# Patient Record
Sex: MALE | HISPANIC OR LATINO | URBAN - METROPOLITAN AREA
[De-identification: names, ages, dates, MRNs, and addresses within clinical notes are randomized per-mention and may not be internally consistent; named-entity substitution may affect disease eponyms.]

---

## 2017-09-07 ENCOUNTER — APPOINTMENT (OUTPATIENT)
Dept: RADIOLOGY | Facility: IMAGING CENTER | Age: 47
End: 2017-09-07
Attending: PREVENTIVE MEDICINE
Payer: COMMERCIAL

## 2017-09-07 ENCOUNTER — OCCUPATIONAL MEDICINE (OUTPATIENT)
Dept: OCCUPATIONAL MEDICINE | Facility: CLINIC | Age: 47
End: 2017-09-07
Payer: COMMERCIAL

## 2017-09-07 VITALS
BODY MASS INDEX: 28.79 KG/M2 | WEIGHT: 190 LBS | OXYGEN SATURATION: 94 % | HEART RATE: 100 BPM | SYSTOLIC BLOOD PRESSURE: 142 MMHG | DIASTOLIC BLOOD PRESSURE: 108 MMHG | HEIGHT: 68 IN | RESPIRATION RATE: 14 BRPM

## 2017-09-07 DIAGNOSIS — M65.311 TRIGGER THUMB OF RIGHT HAND: ICD-10-CM

## 2017-09-07 PROCEDURE — 73140 X-RAY EXAM OF FINGER(S): CPT | Mod: TC,RT | Performed by: EMERGENCY MEDICINE

## 2017-09-07 PROCEDURE — 99204 OFFICE O/P NEW MOD 45 MIN: CPT | Performed by: PREVENTIVE MEDICINE

## 2017-09-07 RX ORDER — PREDNISONE 20 MG/1
20 TABLET ORAL 2 TIMES DAILY
Qty: 14 TAB | Refills: 0 | Status: SHIPPED | OUTPATIENT
Start: 2017-09-07 | End: 2021-01-13

## 2017-09-07 ASSESSMENT — PAIN SCALES - GENERAL: PAINLEVEL: 5=MODERATE PAIN

## 2017-09-07 NOTE — LETTER
"EMPLOYEE’S CLAIM FOR COMPENSATION/ REPORT OF INITIAL TREATMENT  FORM C-4    EMPLOYEE’S CLAIM - PROVIDE ALL INFORMATION REQUESTED   First Name  Yamil Last Name  Chuy Kidd Birthdate                    1970                Sex  male Claim Number   Home Address  [address] Age  47 y.o. Height  1.727 m (5' 8\") Weight  86.2 kg (190 lb) [facility]     [facility] [place] [facility] Zip  [address] Telephone  [phone] (home)    Mailing Address  [address] [facility] [facility] Zip  [address] Primary Language Spoken  English    Insurer   Third Party   [employer]   Employee's Occupation (Job Title) When Injury or Occupational Disease Occurred       Employer's Name  [employer]  Telephone  [phone]    Employer Address  [address]  Zip  [address]   Date of Injury  7/5/2017               Hour of Injury  9:00 PM Date Employer Notified  7/5/2017 Last Day of Work after Injury or Occupational Disease  9/7/2017 Supervisor to Whom Injury Reported  Chema Doyle    Address or Location of Accident (if applicable)  [[facility],[place] ]   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 (Be specific an answer in detail. Use additional sheet if necessary)  part fell off of conveyor and struck employees (me) right thumb    If you believe that you have an occupational disease, when did you first have knowledge of the disability and it relationship to your employment?  N/A  Witnesses to the Accident  N/A       Nature of Injury or Occupational Disease  Defer  Part(s) of Body Injured or Affected  Hand (R), Thumb (R), Defer    I certify that the above is true and correct to the best of my knowledge and that I have provided this information in order to obtain the benefits of [facility] Industrial Insurance and " Occupational Diseases [place]  I hereby authorize any physician, chiropractor, surgeon, practitioner, or other person, any hospital, including [facility] or [facility] hospital, any medical service organization, any insurance company, or other institution or organization to release to each other, any medical or other information, including benefits paid or payable, pertinent to this injury or disease, except information relative to diagnosis, treatment and/or counseling for AIDS, psychological conditions, alcohol or controlled substances, for which I must give specific authorization.  A Photostat of this authorization shall be as valid as the original.     Date   Place   Employee’s Signature   THIS REPORT MUST BE COMPLETED AND MAILED WITHIN 3 WORKING DAYS OF TREATMENT   Place  [facility]  Name of [facility]   Date  9/7/2017 Diagnosis  (M65.311) Trigger thumb of right hand Is there evidence the injured employee was under the influence of alcohol and/or another controlled substance at the time of accident?   Hour  2:46 PM Description of Injury or Disease  The encounter diagnosis was Trigger thumb of right hand. No   Treatment  Trigger thumb-trial oral steroids, may need orthopedic referral  Have you advised the patient to remain off work five days or more? No   X-Ray Findings  Negative   If Yes   From Date  To Date      From information given by the employee, together with medical evidence, can you directly connect this injury or occupational disease as job incurred?  No  Comments:indeterminate If No Full Duty  No Modified Duty  Yes   Is additional medical care by a physician indicated?  Yes If Modified Duty, Specify any Limitations / Restrictions  Limited views of the right thumb as tolerated   Do you know of any previous injury or disease contributing to this condition or occupational disease?                            No  "  Date  9/7/2017 Print Doctor’s Name Ruslan Thomas M.D. I certify the employer’s copy of  this form was mailed on:   Address  [address]   [address] Insurer’s Use Only     [facility]  [address]    Provider’s Tax ID Number  [id number]  Telephone  Dept: [phone]        e-RUSLAN Trimble M.D.   e-Signature:  , Medical Director Degree  MD        ORIGINAL-TREATING PHYSICIAN OR CHIROPRACTOR    PAGE 2-INSURER/TPA    PAGE 3-EMPLOYER    PAGE 4-EMPLOYEE             Form C-4 (rev10/07)              BRIEF DESCRIPTION OF RIGHTS AND BENEFITS  (Pursuant to [place] 616C.050)    Notice of Injury or Occupational Disease (Incident Report Form C-1): If an injury or occupational disease (OD) arises out of and in the  course of employment, you must provide written notice to your employer as soon as practicable, but no later than 7 days after the accident or  OD. Your employer shall maintain a sufficient supply of the required forms.    Claim for Compensation (Form C-4): If medical treatment is sought, the form C-4 is available at the place of initial treatment. A completed  \"Claim for Compensation\" (Form C-4) must be filed within 90 days after an accident or OD. The treating physician or chiropractor must,  within 3 working days after treatment, complete and mail to the employer, the employer's insurer and third-party , the Claim for  Compensation.    Medical Treatment: If you require medical treatment for your on-the-job injury or OD, you may be required to select a physician or  chiropractor from a list provided by your workers’ compensation insurer, if it has contracted with an Organization for Managed Care (MCO) or  Preferred Provider Organization (PPO) or providers of health care. If your employer has not entered into a contract with an MCO or PPO, you  may select a physician or chiropractor from the Panel of Physicians and Chiropractors. Any medical costs related to your industrial injury " or  OD will be paid by your insurer.    Temporary Total Disability (TTD): If your doctor has certified that you are unable to work for a period of at least 5 consecutive days, or 5  cumulative days in a 20-day period, or places restrictions on you that your employer does not accommodate, you may be entitled to TTD  compensation.    Temporary Partial Disability (TPD): If the wage you receive upon reemployment is less than the compensation for TTD to which you are  entitled, the insurer may be required to pay you TPD compensation to make up the difference. TPD can only be paid for a maximum of 24  months.    Permanent Partial Disability (PPD): When your medical condition is stable and there is an indication of a PPD as a result of your injury or  OD, within 30 days, your insurer must arrange for an evaluation by a rating physician or chiropractor to determine the degree of your PPD. The  amount of your PPD award depends on the date of injury, the results of the PPD evaluation and your age and wage.    Permanent Total Disability (PTD): If you are medically certified by a treating physician or chiropractor as permanently and totally disabled  and have been granted a PTD status by your insurer, you are entitled to receive monthly benefits not to exceed 66 2/3% of your average  monthly wage. The amount of your PTD payments is subject to reduction if you previously received a PPD award.    Vocational Rehabilitation Services: You may be eligible for vocational rehabilitation services if you are unable to return to the job due to a  permanent physical impairment or permanent restrictions as a result of your injury or occupational disease.    Transportation and Per Jose M Reimbursement: You may be eligible for travel expenses and per jose m associated with medical treatment.    Reopening: You may be able to reopen your claim if your condition worsens after claim closure.    Appeal Process: If you disagree with a written  determination issued by the insurer or the insurer does not respond to your request, you may  appeal to the Department of Administration, , by following the instructions contained in your determination letter. You must  appeal the determination within 70 days from the date of the determination letter at [address], [address], [place], [place]  [address], or [address], [address], [place], [place] [address]. If you disagree with the  decision, you may appeal to the  Department of Administration, . You must file your appeal within 30 days from the date of the  decision  letter at [address], [address], [place], [place] [address], or [address], [address], [place], [place] [address]. If you  disagree with a decision of an , you may file a petition for judicial review with the District Court. You must do so within 30  days of the Appeal Officer’s decision. You may be represented by an  at your own expense or you may contact the [facility] for possible  representation.    [place]  for Injured Workers (NAIW): If you disagree with a  decision, you may request that NAIW represent you  without charge at an  Hearing. For information regarding denial of benefits, you may contact the [facility] at: [address], [address], [place] [address], [phone], or [address], [place] [address], [phone]    To File a Complaint with the Division: If you wish to file a complaint with the  of the Division of Industrial Relations (DIR),  please contact the Workers’ Compensation Section, [address], [address], [place], [place] [address], telephone [phone], or  [address], [place] [address], telephone [phone].    For assistance with Workers’ Compensation Issues: you may contact the  Office of the Governor Consumer Health Assistance, [address] [place] [address], [address], [place] [address], Toll Free 1-990.352.8962, Web site: http://govcha.[facility].nv., E-mail  [email or web address].                                                                                                                                                                                                                                   __________________________________________________________________                                                                   _________________                Employee Name / Signature                                                                                                                                                       Date                                                                                                                                                                                                     D-2 (rev. 10/07)

## 2017-09-07 NOTE — LETTER
"[address],   [address] [place] [address]  Phone: [phone] - Fax: [phone]        Occupational Health [facility] Progress Report and Disability Certification  Date of Service: 9/7/2017   No Show:  No  Date / Time of Next Visit: 9/28/2017@4:20PM    Claim Information   Patient Name: Yamil Kidd  Claim Number:     Employer: [employer]  Date of Injury: 7/5/2017     Insurer / TPA: [employer]  ID / SSN:     Occupation:    Diagnosis: The encounter diagnosis was Trigger thumb of right hand.    Medical Information   Related to Industrial Injury? No  Comments:indeterminate-cannot say with any degree of medical certainty that this is industrial injury    Subjective Complaints:  Date of incident 7/5/2017. Incident-\"part fell off conveyor struck employee's right thumb\". 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 This occurred 2 months ago. He now complains of joint dysfunction. His pain level is 5/10. No numbness or weakness.     Objective Findings: Appearance: Well-developed, well-nourished.   Mental Status: Mood and Affect normal. Pleasant. Cooperative. Appropriate.   ENT: Oropharynx clear. Moist mucous membranes. Hearing normal   Eyes: Pupils reactive. Conjunctiva normal. No scleral icterus.   Neck: Trachea Midline. No thyromegaly. No masses.  Cardiovascular: Normal rate. Regular rhythm. Normal heart sounds.   Chest: Effort normal. Breath sounds clear.   Skin: Skin is warm and dry. No rash.   Musculoskeletal: Right thumb shows no ecchymosis, swelling, heat. Triggering phenomena at IP joint. Distal neurovascular intact.     Pre-Existing Condition(s):     Assessment:   Initial Visit    Status: Additional Care Required  Permanent Disability:No    Plan: Medication    Diagnostics:      Comments:     "   Disability Information   Status: Released to Restricted Duty    From:  9/7/2017  Through: 9/28/2017 Restrictions are: Temporary   Physical Restrictions   Sitting:    Standing:    Stooping:    Bending:      Squatting:    Walking:    Climbing:    Pushing:      Pulling:    Other:    Reaching Above Shoulder (L):   Reaching Above Shoulder (R):       Reaching Below Shoulder (L):    Reaching Below Shoulder (R):      Not to exceed Weight Limits   Carrying(hrs):   Weight Limit(lb):   Lifting(hrs):   Weight  Limit(lb):     Comments: Limited use of right hand as tolerated.    Repetitive Actions   Hands: i.e. Fine Manipulations from Grasping:     Feet: i.e. Operating Foot Controls:     Driving / Operate Machinery:     Physician Name: Ruslan Thomas M.D. Physician Signature: RUSLAN Small M.D. e-Signature:  , Medical Director   Clinic Name / Location: [address],   [address] [place] [address] Clinic Phone Number: Dept: [phone]   Appointment Time: 2:20 Pm Visit Start Time: 2:46 PM   Check-In Time:  2:40 Pm Visit Discharge Time: @4:13PM    Original-Treating Physician or Chiropractor    Page 2-Insurer/TPA    Page 3-Employer    Page 4-Employee

## 2017-09-07 NOTE — PROGRESS NOTES
"Subjective:      Yamil Kidd is a 47 y.o. male who presents with Follow-Up (NEW WC DOI 07/05/2017 - R Thumb - ROOM 3)      Date of incident 7/5/2017. Incident-\"part fell off conveyor struck employee's right thumb\". 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 This occurred 2 months ago. He now complains of joint dysfunction. His pain level is 5/10. No numbness or weakness.       HPI    ROS  Comprehensive medical history form reviewed. Pertinent positives and negatives included in HPI.    PFSH: reviewed in Epic    PMH:  has no past medical history on file.  MEDS:   Current Outpatient Prescriptions:   •  predniSONE (DELTASONE) 20 MG Tab, Take 1 Tab by mouth 2 times a day., Disp: 14 Tab, Rfl: 0  •  hydroxyzine (VISTARIL) 25 MG CAPS, Take 1 Cap by mouth 3 times a day as needed for Itching., Disp: 20 Cap, Rfl: 0  ALLERGIES: No Known Allergies  SURGHX: No past surgical history on file.  SOCHX:    Work Status:   FH: No pertinent hereditary disorders.        Objective:     /108   Pulse 100   Resp 14   Ht 1.727 m (5' 8\")   Wt 86.2 kg (190 lb)   SpO2 94%   BMI 28.89 kg/m²      Physical Exam    Appearance: Well-developed, well-nourished.   Mental Status: Mood and Affect normal. Pleasant. Cooperative. Appropriate.   ENT: Oropharynx clear. Moist mucous membranes. Hearing normal   Eyes: Pupils reactive. Conjunctiva normal. No scleral icterus.   Neck: Trachea Midline. No thyromegaly. No masses.  Cardiovascular: Normal rate. Regular rhythm. Normal heart sounds.   Chest: Effort normal. Breath sounds clear.   Skin: Skin is warm and dry. No rash.   Musculoskeletal: Right thumb shows no ecchymosis, swelling, heat. Triggering phenomena at IP joint. Distal neurovascular intact.         Assessment/Plan:     1. Trigger thumb of right hand  New to  Occupational " Health  Indeterminate causation-cannot say with any degree of medical certainty that this is an industrial illness or injury. 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 Other activities outside of work may be contributory- DX-FINGER(S) 2+ RIGHT; Future  - predniSONE (DELTASONE) 20 MG Tab; Take 1 Tab by mouth 2 times a day.  Dispense: 14 Tab; Refill: 0  - Activity as tolerated  - Recheck in 2 weeks not improved will consider steroid injection and/or referral

## 2017-09-07 NOTE — PROGRESS NOTES
Patient was seen today for a wc, manager with patient stated that no post accident testing is to be done. Per CARMEN if the manager with the patient says to no do post accident testing we are not to.

## 2017-09-28 ENCOUNTER — OCCUPATIONAL MEDICINE (OUTPATIENT)
Dept: OCCUPATIONAL MEDICINE | Facility: CLINIC | Age: 47
End: 2017-09-28
Payer: COMMERCIAL

## 2017-09-28 VITALS
SYSTOLIC BLOOD PRESSURE: 120 MMHG | OXYGEN SATURATION: 96 % | WEIGHT: 200 LBS | HEIGHT: 68 IN | HEART RATE: 73 BPM | BODY MASS INDEX: 30.31 KG/M2 | TEMPERATURE: 98.3 F | DIASTOLIC BLOOD PRESSURE: 68 MMHG | RESPIRATION RATE: 14 BRPM

## 2017-09-28 DIAGNOSIS — M65.311 TRIGGER THUMB OF RIGHT HAND: ICD-10-CM

## 2017-09-28 PROCEDURE — 99213 OFFICE O/P EST LOW 20 MIN: CPT | Performed by: PREVENTIVE MEDICINE

## 2017-09-28 NOTE — PROGRESS NOTES
"Subjective:      Yamil Kidd is a 47 y.o. male who presents with Follow-Up (WC [facility] DOI 7-5-17 (R) thumb feeling the same with pain going up his arm and to head Room 24)      Date of incident 7/5/2017. Incident-\"part fell off conveyor struck employee's right thumb\". 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 This occurred 2 months ago. Today, he reports no change. Pain radiates up his arm to his neck into his head.     HPI    ROS  PFSH:  WORK STATUS: Restricted activity  PMH:  has no past medical history on file.  MEDS:   Current Outpatient Prescriptions:   •  predniSONE (DELTASONE) 20 MG Tab, Take 1 Tab by mouth 2 times a day., Disp: 14 Tab, Rfl: 0  •  hydroxyzine (VISTARIL) 25 MG CAPS, Take 1 Cap by mouth 3 times a day as needed for Itching., Disp: 20 Cap, Rfl: 0       Objective:     /68   Pulse 73   Temp 36.8 °C (98.3 °F)   Resp 14   Ht 1.727 m (5' 8\")   Wt 90.7 kg (200 lb)   SpO2 96%   BMI 30.41 kg/m²      Physical Exam    Right thumb exam shows persistent triggering at the IP joint. No significant swelling. Minimal tenderness.       Assessment/Plan:     1. Trigger thumb of right hand  Indeterminate causation-will request a sports medicine referral to expedite a decision on claim acceptance or denial  - REFERRAL TO SPORTS MEDICINE  - Activity as tolerated right hand  - Recheck in one month    "

## 2017-09-28 NOTE — LETTER
"[address],   [address] [place] [address]  Phone:  [phone] - Fax:  [phone]   [facility] Progress Report and Disability Certification  Date of Service: 9/28/2017   No Show:  No  Date / Time of Next Visit: 10/26/2017 @ 2:00 PM   Claim Information   Patient Name: Yamil Kidd  Claim Number:     Employer: [employer]  Date of Injury: 7/5/2017     Insurer / TPA: [employer]  ID / SSN:     Occupation:    Diagnosis: The encounter diagnosis was Trigger thumb of right hand.    Medical Information   Related to Industrial Injury? No    Subjective Complaints:  Date of incident 7/5/2017. Incident-\"part fell off conveyor struck employee's right thumb\". 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 This occurred 2 months ago. Today, he reports no change. Pain radiates up his arm to his neck into his head.   Objective Findings: Right thumb exam shows persistent triggering at the IP joint. No significant swelling. Minimal tenderness.   Pre-Existing Condition(s):     Assessment:   Condition Same    Status: Additional Care Required  Permanent Disability:No    Plan: Consultation    Diagnostics:      Comments:  Sports medicine consultation for steroid injection if indicated    Disability Information   Status: Released to Restricted Duty    From:  9/28/2017  Through: 10/26/2017 Restrictions are: Temporary   Physical Restrictions   Sitting:    Standing:    Stooping:    Bending:      Squatting:    Walking:    Climbing:    Pushing:      Pulling:    Other:    Reaching Above Shoulder (L):   Reaching Above Shoulder (R):       Reaching Below Shoulder (L):    Reaching Below Shoulder (R):      Not to exceed Weight Limits   Carrying(hrs):   Weight Limit(lb):   Lifting(hrs):   Weight  Limit(lb):     Comments: " Activity as tolerated with right hand    Repetitive Actions   Hands: i.e. Fine Manipulations from Grasping:     Feet: i.e. Operating Foot Controls:     Driving / Operate Machinery:     Physician Name: Ruslan Thomas M.D. Physician Signature: RUSLAN Small M.D. e-Signature:  , Medical Director   Clinic Name / Location: [address],   [address] [place] [address] Clinic Phone Number: Dept: [phone]   Appointment Time: 4:20 Pm Visit Start Time: 4:23 PM   Check-In Time:  4:16 Pm Visit Discharge Time: 5:10 PM   Original-Treating Physician or Chiropractor    Page 2-Insurer/TPA    Page 3-Employer    Page 4-Employee

## 2021-01-13 ENCOUNTER — OFFICE VISIT (OUTPATIENT)
Dept: URGENT CARE | Facility: PHYSICIAN GROUP | Age: 51
End: 2021-01-13
Payer: COMMERCIAL

## 2021-01-13 ENCOUNTER — HOSPITAL ENCOUNTER (OUTPATIENT)
Facility: MEDICAL CENTER | Age: 51
End: 2021-01-13
Attending: NURSE PRACTITIONER
Payer: COMMERCIAL

## 2021-01-13 VITALS
RESPIRATION RATE: 14 BRPM | OXYGEN SATURATION: 98 % | WEIGHT: 205 LBS | SYSTOLIC BLOOD PRESSURE: 124 MMHG | BODY MASS INDEX: 32.18 KG/M2 | HEIGHT: 67 IN | DIASTOLIC BLOOD PRESSURE: 78 MMHG | TEMPERATURE: 98.9 F | HEART RATE: 72 BPM

## 2021-01-13 DIAGNOSIS — R05.9 COUGH: ICD-10-CM

## 2021-01-13 DIAGNOSIS — J02.9 SORE THROAT: ICD-10-CM

## 2021-01-13 DIAGNOSIS — R53.83 FATIGUE, UNSPECIFIED TYPE: ICD-10-CM

## 2021-01-13 DIAGNOSIS — J98.8 VIRAL RESPIRATORY ILLNESS: ICD-10-CM

## 2021-01-13 DIAGNOSIS — B97.89 VIRAL RESPIRATORY ILLNESS: ICD-10-CM

## 2021-01-13 PROCEDURE — 99214 OFFICE O/P EST MOD 30 MIN: CPT | Performed by: NURSE PRACTITIONER

## 2021-01-13 PROCEDURE — U0005 INFEC AGEN DETEC AMPLI PROBE: HCPCS

## 2021-01-13 PROCEDURE — U0003 INFECTIOUS AGENT DETECTION BY NUCLEIC ACID (DNA OR RNA); SEVERE ACUTE RESPIRATORY SYNDROME CORONAVIRUS 2 (SARS-COV-2) (CORONAVIRUS DISEASE [COVID-19]), AMPLIFIED PROBE TECHNIQUE, MAKING USE OF HIGH THROUGHPUT TECHNOLOGIES AS DESCRIBED BY CMS-2020-01-R: HCPCS

## 2021-01-13 RX ORDER — BENZONATATE 100 MG/1
100 CAPSULE ORAL 3 TIMES DAILY PRN
Qty: 60 CAP | Refills: 0 | Status: SHIPPED | OUTPATIENT
Start: 2021-01-13 | End: 2022-02-11

## 2021-01-13 ASSESSMENT — ENCOUNTER SYMPTOMS
NECK PAIN: 0
COUGH: 1
ANOREXIA: 0
FATIGUE: 1
VERTIGO: 0
MYALGIAS: 1
VOMITING: 0
FEVER: 0
ABDOMINAL PAIN: 0
ARTHRALGIAS: 1
HEADACHES: 1
SORE THROAT: 1
JOINT SWELLING: 0
WEAKNESS: 0
BODY ACHES: 1
SWOLLEN GLANDS: 0
CHILLS: 1

## 2021-01-13 NOTE — PROGRESS NOTES
"Yamil Kidd is a 50 y.o. male who presents for Generalized Body Aches (bodyaches, loss of taste/smell x2-3 days)      Generalized Body Aches  This is a new problem. The current episode started in the past 7 days. The problem occurs intermittently. The problem has been unchanged. Associated symptoms include arthralgias, chills, congestion, coughing, fatigue, headaches, myalgias and a sore throat. Pertinent negatives include no abdominal pain, anorexia, fever, joint swelling, neck pain, rash, swollen glands, urinary symptoms, vertigo, vomiting or weakness. Nothing aggravates the symptoms. He has tried acetaminophen, sleep, rest, relaxation and drinking for the symptoms. The treatment provided mild relief.       Review of Systems   Constitutional: Positive for chills and fatigue. Negative for fever.   HENT: Positive for congestion and sore throat.    Respiratory: Positive for cough.    Gastrointestinal: Negative for abdominal pain, anorexia and vomiting.   Musculoskeletal: Positive for arthralgias and myalgias. Negative for joint swelling and neck pain.   Skin: Negative for rash.   Neurological: Positive for headaches. Negative for vertigo and weakness.       No Known Allergies  No past medical history on file.  No past surgical history on file.  No family history on file.  Social History     Tobacco Use   • Smoking status: Not on file   Substance Use Topics   • Alcohol use: Not on file     There is no problem list on file for this patient.    No current outpatient medications on file prior to visit.     No current facility-administered medications on file prior to visit.           Objective:     /78 (BP Location: Left arm, Patient Position: Sitting, BP Cuff Size: Small adult)   Pulse 72   Temp 37.2 °C (98.9 °F) (Temporal)   Resp 14   Ht 1.702 m (5' 7\")   Wt 93 kg (205 lb)   SpO2 98%   BMI 32.11 kg/m²     Physical Exam  Vitals signs and nursing note reviewed.   Constitutional:       General: He is " not in acute distress.     Appearance: Normal appearance. He is well-developed, well-groomed and normal weight. He is not ill-appearing or toxic-appearing.   HENT:      Head: Normocephalic.      Mouth/Throat:      Mouth: Mucous membranes are moist.      Pharynx: Posterior oropharyngeal erythema (mild) present.   Eyes:      General: Lids are normal.      Extraocular Movements: Extraocular movements intact.      Conjunctiva/sclera: Conjunctivae normal.   Neck:      Musculoskeletal: Full passive range of motion without pain, normal range of motion and neck supple. No neck rigidity.   Cardiovascular:      Rate and Rhythm: Normal rate and regular rhythm.      Pulses: Normal pulses.      Heart sounds: Normal heart sounds.   Pulmonary:      Effort: Pulmonary effort is normal. No accessory muscle usage.      Breath sounds: Normal breath sounds and air entry.   Musculoskeletal: Normal range of motion.      Right lower leg: No edema.      Left lower leg: No edema.   Lymphadenopathy:      Cervical: No cervical adenopathy.      Upper Body:      Right upper body: No supraclavicular adenopathy.      Left upper body: No supraclavicular adenopathy.   Skin:     General: Skin is warm and dry.      Capillary Refill: Capillary refill takes less than 2 seconds.      Findings: No rash.   Neurological:      General: No focal deficit present.      Mental Status: He is alert and oriented to person, place, and time.      Coordination: Coordination is intact.   Psychiatric:         Attention and Perception: Attention normal.         Mood and Affect: Mood and affect normal.         Speech: Speech normal.         Behavior: Behavior normal. Behavior is cooperative.         Thought Content: Thought content normal.         Judgment: Judgment normal.         Assessment /Associated Orders:      1. Viral respiratory illness  SARS-CoV-2 PCR (24 hour In-House): Collect NP swab in [facility]   2. Cough  benzonatate (TESSALON) 100 MG Cap   3. Fatigue,  unspecified type     4. Sore throat           Medical Decision Making:    Pt is clinically stable at today's acute urgent care visit.  No acute distress noted.   Pertinent previous visits, medications, & / or diagnostic tests reviewed personally by me in the EHR today.   Past medical, surgical, family and social hx were reviewed with the patient today during this visit.   Return to urgent care clinic or PCP prn  if current symptoms are not resolving in a satisfactory manner or sooner if new or worsening symptoms occur.   Differential diagnosis, natural history, supportive care, and indications for immediate follow-up. Advised of signs and symptoms which would warrant further evaluation and /or emergent evaluation in ER.     COVID PCR testing - pending- (results to be released to [employer] if available)   Self Quarantine per CDC guidelines  Educated in infection control practices.   Discussed that this illness was viral in nature. Did not see any evidence of a bacterial process.   OTC  analgesic/ antipyretic of choice ( acetaminophen or NSAID). Follow manufactures dosing and safety precautions.   OTC medications for symptomatic relief of symptoms  Keep well hydrated  Increase rest      Please note that this dictation was created using voice recognition software. I have made a reasonable attempt to correct obvious errors, but I expect that there are errors of grammar and possibly content that I did not discover before finalizing the note.    This note was electronically signed by Em TELLEZ, Urgent Care

## 2021-01-13 NOTE — LETTER
January 13, 2021       Patient: Yamil Kidd   YOB: 1970   Date of Visit: 1/13/2021     To Whom it May Concern,   Your employee was seen in our clinic today. A concern for COVID-19 has been identified and testing is in progress.  We are asking you to excuse absences while following self-isolation protocol per Center for Disease Control (CDC) guidelines. Your employee will be able to access test results through our electronic delivery system called [employer].  You can be around others after:          - 10 days since symptoms first appeared and        - 24 hours with no fever without the use of fever-reducing medications and        - Other symptoms of COVID-19 are improving*           *Loss of taste and smell may persist for weeks or months after recovery and need not delay the end of isolation   The guidelines are from the CDC and apply even if you have tested negative for  COVID-19 infection.   You can contact the [facility] at [phone] or [facility] [phone] if you have questions.    If the results of testing are positive then your employee will be contacted by the [facility] or [facility] for further instructions on duration of self-isolation and return to work protocol. In general, this will also follow the CDC guidelines.   In general, repeat testing is not necessary and not offered through our [facility] care.    This is the only note that will be provided from [facility] for this visit. Your employee will require an appointment with a primary care provider if FMLA or disability forms are required.     Sincerely,        PA Bolden.  Electronically Signed

## 2021-01-14 DIAGNOSIS — B97.89 VIRAL RESPIRATORY ILLNESS: ICD-10-CM

## 2021-01-14 DIAGNOSIS — J98.8 VIRAL RESPIRATORY ILLNESS: ICD-10-CM

## 2021-01-14 LAB
COVID ORDER STATUS COVID19: NORMAL
SARS-COV-2 RNA RESP QL NAA+PROBE: DETECTED
SPECIMEN SOURCE: ABNORMAL

## 2021-03-05 ENCOUNTER — OFFICE VISIT (OUTPATIENT)
Dept: URGENT CARE | Facility: PHYSICIAN GROUP | Age: 51
End: 2021-03-05
Payer: COMMERCIAL

## 2021-03-05 VITALS
TEMPERATURE: 98 F | DIASTOLIC BLOOD PRESSURE: 70 MMHG | OXYGEN SATURATION: 98 % | SYSTOLIC BLOOD PRESSURE: 110 MMHG | RESPIRATION RATE: 16 BRPM | HEART RATE: 72 BPM | BODY MASS INDEX: 33.12 KG/M2 | HEIGHT: 67 IN | WEIGHT: 211 LBS

## 2021-03-05 DIAGNOSIS — K64.4 EXTERNAL HEMORRHOID: ICD-10-CM

## 2021-03-05 PROCEDURE — 99203 OFFICE O/P NEW LOW 30 MIN: CPT | Performed by: FAMILY MEDICINE

## 2021-03-05 RX ORDER — HYDROCORTISONE 25 MG/G
1 CREAM TOPICAL 2 TIMES DAILY
Qty: 30 G | Refills: 1 | Status: SHIPPED | OUTPATIENT
Start: 2021-03-05 | End: 2021-03-12

## 2021-03-05 RX ORDER — HYDROCORTISONE ACETATE 25 MG/1
25 SUPPOSITORY RECTAL EVERY 12 HOURS
Qty: 14 SUPPOSITORY | Refills: 1 | Status: SHIPPED | OUTPATIENT
Start: 2021-03-05 | End: 2021-03-12

## 2021-03-05 ASSESSMENT — ENCOUNTER SYMPTOMS
DIZZINESS: 0
CHANGE IN BOWEL HABIT: 0
BLOOD IN STOOL: 0
VOMITING: 0
SHORTNESS OF BREATH: 0
NAUSEA: 0
CONSTIPATION: 0
SORE THROAT: 0
ABDOMINAL PAIN: 0
FEVER: 0
CHILLS: 0
MYALGIAS: 0
DIARRHEA: 0
COUGH: 0

## 2021-03-05 NOTE — PROGRESS NOTES
Subjective:   Yamil Kidd is a 50 y.o. male who presents for Anal Itching (Pt reports anal itching, pain, blood when wiping. Onset 1 month. )        A qualified  was used to interpret   during this encounter.  ’s name/ID number was  Lon and mode of interpretation was Inhouse   .      Anal Itching  This is a new (Complains of anal lump, itching over the past month, worse with spicy foods) problem. Episode onset: 1 month. The problem occurs intermittently. The problem has been unchanged. Pertinent negatives include no abdominal pain, change in bowel habit, chills, coughing, fever, myalgias, nausea, rash, sore throat or vomiting. Exacerbated by: Spicy foods. He has tried rest for the symptoms. The treatment provided no relief.     PMH:  has no past medical history on file.  MEDS:   Current Outpatient Medications:   •  hydrocortisone (ANUSOL-HC) 25 MG Suppos, Insert 1 Suppository into the rectum every 12 hours for 7 days., Disp: 14 Suppository, Rfl: 1  •  hydrocortisone rectal (PERIANAL) 2.5% Cream, Insert 1 Application into the rectum 2 times a day for 7 days., Disp: 30 g, Rfl: 1  •  benzonatate (TESSALON) 100 MG Cap, Take 1 Cap by mouth 3 times a day as needed for Cough. (Patient not taking: Reported on 3/5/2021), Disp: 60 Cap, Rfl: 0  ALLERGIES: No Known Allergies  SURGHX: History reviewed. No pertinent surgical history.  SOCHX:  reports that he has never smoked. He has never used smokeless tobacco. He reports current alcohol use of about 1.2 oz of alcohol per week. He reports that he does not use drugs.  FH: History reviewed. No pertinent family history.  Review of Systems   Constitutional: Negative for chills and fever.   HENT: Negative for sore throat.    Respiratory: Negative for cough and shortness of breath.    Gastrointestinal: Negative for abdominal pain, blood in stool (Complains of intermittent blood on tissue paper), change in bowel habit, constipation,  "diarrhea, nausea and vomiting.   Genitourinary: Negative for dysuria.   Musculoskeletal: Negative for myalgias.   Skin: Positive for itching. Negative for rash.   Neurological: Negative for dizziness.        Objective:   /70 (BP Location: Left arm, Patient Position: Sitting, BP Cuff Size: Large adult)   Pulse 72   Temp 36.7 °C (98 °F) (Temporal)   Resp 16   Ht 1.702 m (5' 7\")   Wt 95.7 kg (211 lb)   SpO2 98%   BMI 33.05 kg/m²   Physical Exam  Vitals and nursing note reviewed. Exam conducted with a chaperone present.   Constitutional:       General: He is not in acute distress.     Appearance: He is well-developed.   HENT:      Head: Normocephalic and atraumatic.      Right Ear: External ear normal.      Left Ear: External ear normal.      Nose: Nose normal.      Mouth/Throat:      Mouth: Mucous membranes are moist.   Eyes:      Conjunctiva/sclera: Conjunctivae normal.   Cardiovascular:      Rate and Rhythm: Normal rate.   Pulmonary:      Effort: Pulmonary effort is normal. No respiratory distress.      Breath sounds: Normal breath sounds.   Abdominal:      General: There is no distension.   Genitourinary:     Rectum: External hemorrhoid present. No tenderness, anal fissure or internal hemorrhoid. Normal anal tone.       Musculoskeletal:         General: Normal range of motion.   Skin:     General: Skin is warm and dry.   Neurological:      General: No focal deficit present.      Mental Status: He is alert and oriented to person, place, and time. Mental status is at baseline.      Gait: Gait (gait at baseline) normal.   Psychiatric:         Judgment: Judgment normal.           Assessment/Plan:   1. External hemorrhoid  - hydrocortisone (ANUSOL-HC) 25 MG Suppos; Insert 1 Suppository into the rectum every 12 hours for 7 days.  Dispense: 14 Suppository; Refill: 1  - hydrocortisone rectal (PERIANAL) 2.5% Cream; Insert 1 Application into the rectum 2 times a day for 7 days.  Dispense: 30 g; Refill: " 1        Medical Decision Making/Course:  In the course of preparing for this visit with review of the pertinent past medical history, recent and past clinic visits, current medications, and in the further course of obtaining the current history pertinent to the clinic visit today, performing an exam and evaluation, ordering and independently evaluating labs, tests, and/or procedures, prescribing any recommended new medications, providing any pertinent counseling and education and recommending further coordination of care including discussion of symptomatic and supportive measures, dietary and behavioral modifications, further counseling and recommendations concerning anal hygiene and specific dietary recommendations, recommendations follow-up with primary care provider with no resolution of symptoms over the next 2 to 4 weeks and or for worsening symptoms, and advise to return to urgent care for any persistent or worsening symptoms, at least 35 minutes of total time were spent during this encounter.      Discussed close monitoring, return precautions, and supportive measures of maintaining adequate fluid hydration and caloric intake, relative rest and symptom management as needed for pain and/or fever.    Differential diagnosis, natural history, supportive care, and indications for immediate follow-up discussed.     Advised the patient to follow-up with the primary care physician for recheck, reevaluation, and consideration of further management.    Please note that this dictation was created using voice recognition software. I have worked with consultants from the vendor as well as technical experts from [employer] to optimize the interface. I have made every reasonable attempt to correct obvious errors, but I expect that there are errors of grammar and possibly content that I did not discover before finalizing the note.

## 2021-03-05 NOTE — PATIENT INSTRUCTIONS
Preparation H wipes        Hemorroides  Hemorrhoids  Las hemorroides son venas inflamadas que pueden desarrollarse:  · En el ano (recto). Estas se denominan hemorroides internas.  · Alrededor de la abertura del ano. Estas se denominan hemorroides externas.  Las hemorroides pueden causar dolor, picazón o hemorragias. Generalmente no causan problemas graves. Con frecuencia mejoran al cambiar la dieta, el estilo de becky y otros tratamientos en el hogar.  ¿Cuáles son las causas?  Esta afección puede ser causada por lo siguiente:  · Tener dificultad para defecar (estreñimiento).  · Hacer mucha fuerza (esfuerzo) para defecar.  · Materia fecal líquida (diarrea).  · Embarazo.  · Tener mucho sobrepeso (obesidad).  · Estar sentado eddi largos períodos de tiempo.  · Levantar objetos pesados u otras actividades que impliquen esfuerzo.  · Sexo anal.  · Andar en bicicleta por un yas período de tiempo.  ¿Cuáles son los signos o los síntomas?  Los síntomas de esta afección incluyen los siguientes:  · Dolor.  · Picazón o irritación en el ano.  · Sangrado proveniente del ano.  · Pérdida de materia fecal.  · Inflamación en la jose g.  · Terry o más bultos alrededor de la abertura del ano.  ¿Cómo se diagnostica?  A menudo un médico puede diagnosticar esta afección al observar la jose g afectada. El médico también puede:  · Realizar un examen que implica palpar la jose g con la mano enguantada (examen rectal digital).  · Examinar el interior de la jose g anal utilizando un pequeño tubo (anoscopio).  · Pedir análisis de ronnie. Es posible que esto se realice si ha perdido mucha ronnie.  · Solicitarle un estudio que consiste en la observación del interior del colon utilizando un tubo flexible con sander cámara en el extremo (sigmoidoscopia o colonoscopía).  ¿Cómo se trata?  Esta afección generalmente se puede tratar en el hogar. El médico puede indicarle que cambie de alimentación, de estilo de becky o que trate de realizar tratamientos en el  hogar. Si esto no da resultado, se pueden realizar procedimientos para extirpar las hemorroides o reducir gomez tamaño. Estos pueden implicar lo siguiente:  · Colocar bandas elásticas en la base de las hemorroides para interrumpir la irrigación de la ronnie.  · Inyectar un medicamento en las hemorroides para reducir gomez tamaño.  · Dirigir un tipo de energía de deisi hacia las hemorroides para hacer que se caigan.  · Realizar sander cirugía para extirpar las hemorroides o cortar la irrigación de ronnie.  Siga estas indicaciones en gomez casa:  Comida y bebida    · Consuma alimentos con alto contenido de fibra. Entre ellos cereales integrales, frijoles, alexander secos, frutas y verduras.  · Pregúntele a gomez médico acerca de leonarda productos con fibra añadida en ellos (complementos de fibra).  · Disminuya la cantidad de grasa de la dieta. Para esto, puede hacer lo siguiente:  ? Coma productos lácteos descremados.  ? Coma menos carne adrian.  ? No consuma alimentos procesados.  · Mary suficiente líquido para mantener la orina de color amarillo pálido.  Control del dolor y la hinchazón    · [place] un baño de agua tibia (baño de asiento) eddi 20 minutos para aliviar el dolor. Hágalo 3 o 4 veces al día. Puede hacer esto en sander bañera o usar un dispositivo portátil para baño de asiento que se coloca sobre el inodoro.  · Si se lo indican, aplique hielo sobre la jose g dolorida. Puede ser beneficioso aplicar hielo entre los ramona con agua tibia.  ? Ponga el hielo en sander bolsa plástica.  ? Coloque sander toalla entre la piel y la bolsa.  ? Coloque el hielo eddi 20 minutos, 2 a 3 veces por día.  Indicaciones generales  · [place] los medicamentos de venta arjun y los recetados solamente merna se lo haya indicado el médico.  ? Las cremas medicinales y los medicamentos pueden usarse merna se lo hayan indicado.  · Alesha ejercicio físico con frecuencia. Consulte al médico qué tipos de ejercicios son mejores para usted y qué cantidad.  · Vaya al baño cuando  sienta ganas de defecar. No espere.  · Evite hacer demasiada fuerza al defecar.  · Mantenga el ano seco y limpio. Use papel higiénico húmedo o toallitas humedecidas después de defecar.  · No pase mucho tiempo sentado en el inodoro.  · Concurra a todas las visitas de seguimiento merna se lo haya indicado el médico. [place] es importante.  Comuníquese con un médico si:  · Tiene dolor e hinchazón que no mejoran con el tratamiento o los medicamentos.  · Tiene problemas para defecar.  · No puede defecar.  · Tiene dolor o hinchazón en la jose g exterior de las hemorroides.  Solicite ayuda inmediatamente si tiene:  · Hemorragia que no se detiene.  Resumen  · Las hemorroides son venas hinchadas en el ano o la jose g que rodea el ano.  · Pueden causar dolor, picazón o sangrado.  · Consuma alimentos con alto contenido de fibra. Entre ellos cereales integrales, frijoles, alexander secos, frutas y verduras.  · [place] un baño de agua tibia (baño de asiento) eddi 20 minutos para aliviar el dolor. Hágalo 3 o 4 veces al día.  Esta información no tiene merna fin reemplazar el consejo del médico. Asegúrese de hacerle al médico cualquier pregunta que tenga.  Document Released: 04/14/2014 Document Revised: 06/27/2019 Document Reviewed: 06/27/2019  Elsevier Patient Education © 2020 Elsevier Inc.

## 2021-06-25 ENCOUNTER — APPOINTMENT (RX ONLY)
Dept: URBAN - METROPOLITAN AREA CLINIC 4 | Facility: CLINIC | Age: 51
Setting detail: DERMATOLOGY
End: 2021-06-25

## 2021-06-25 DIAGNOSIS — D22 MELANOCYTIC NEVI: ICD-10-CM

## 2021-06-25 DIAGNOSIS — L81.4 OTHER MELANIN HYPERPIGMENTATION: ICD-10-CM

## 2021-06-25 PROBLEM — D22.4 MELANOCYTIC NEVI OF SCALP AND NECK: Status: ACTIVE | Noted: 2021-06-25

## 2021-06-25 PROBLEM — D48.5 NEOPLASM OF UNCERTAIN BEHAVIOR OF SKIN: Status: ACTIVE | Noted: 2021-06-25

## 2021-06-25 PROBLEM — D22.39 MELANOCYTIC NEVI OF OTHER PARTS OF FACE: Status: ACTIVE | Noted: 2021-06-25

## 2021-06-25 PROCEDURE — ? BIOPSY BY SHAVE METHOD

## 2021-06-25 PROCEDURE — 11102 TANGNTL BX SKIN SINGLE LES: CPT

## 2021-06-25 PROCEDURE — ? COUNSELING

## 2021-06-25 PROCEDURE — 99202 OFFICE O/P NEW SF 15 MIN: CPT | Mod: 25

## 2021-06-25 ASSESSMENT — LOCATION DETAILED DESCRIPTION DERM
LOCATION DETAILED: LEFT INFERIOR FOREHEAD
LOCATION DETAILED: RIGHT CENTRAL MALAR CHEEK
LOCATION DETAILED: RIGHT SUPERIOR LATERAL MALAR CHEEK
LOCATION DETAILED: LEFT INFERIOR LATERAL NECK
LOCATION DETAILED: RIGHT INFERIOR LATERAL NECK

## 2021-06-25 ASSESSMENT — LOCATION ZONE DERM
LOCATION ZONE: FACE
LOCATION ZONE: NECK

## 2021-06-25 ASSESSMENT — LOCATION SIMPLE DESCRIPTION DERM
LOCATION SIMPLE: LEFT FOREHEAD
LOCATION SIMPLE: RIGHT CHEEK
LOCATION SIMPLE: LEFT ANTERIOR NECK
LOCATION SIMPLE: RIGHT ANTERIOR NECK

## 2021-06-25 NOTE — PROCEDURE: BIOPSY BY SHAVE METHOD
Detail Level: Detailed
Depth Of Biopsy: dermis
Was A Bandage Applied: Yes
Size Of Lesion In Cm: 0.3
X Size Of Lesion In Cm: 0
Biopsy Type: H and E
Biopsy Method: Personna blade
Anesthesia Type: 1% lidocaine with epinephrine
Anesthesia Volume In Cc: 1
Hemostasis: Electrocautery
Wound Care: Vaseline
Dressing: Band-Aid
Destruction After The Procedure: No
Type Of Destruction Used: Electrodesiccation
Curettage Text: The wound bed was treated with curettage after the biopsy was performed.
Cryotherapy Text: The wound bed was treated with cryotherapy after the biopsy was performed.
Electrodesiccation Text: The wound bed was treated with electrodesiccation after the biopsy was performed.
Electrodesiccation And Curettage Text: The wound bed was treated with electrodesiccation and curettage after the biopsy was performed.
Silver Nitrate Text: The wound bed was treated with silver nitrate after the biopsy was performed.
Lab: [id number]
Lab Facility: 
Consent: Written consent was obtained and risks were reviewed including but not limited to scarring, infection, bleeding, scabbing, incomplete removal, nerve damage and allergy to anesthesia.
Post-Care Instructions: I reviewed with the patient in detail post-care instructions. Patient is to keep the biopsy site dry overnight, and then apply bacitracin/petroleum  twice daily until healed. Patient may apply hydrogen peroxide soaks to remove any crusting.
Notification Instructions: Patient will be notified of biopsy results. However, patient instructed to call the office if not contacted within 2 weeks.
Billing Type: Third-Party Bill
Information: Selecting Yes will display possible errors in your note based on the variables you have selected. This validation is only offered as a suggestion for you. PLEASE NOTE THAT THE VALIDATION TEXT WILL BE REMOVED WHEN YOU FINALIZE YOUR NOTE. IF YOU WANT TO FAX A PRELIMINARY NOTE YOU WILL NEED TO TOGGLE THIS TO 'NO' IF YOU DO NOT WANT IT IN YOUR FAXED NOTE.

## 2022-02-11 ENCOUNTER — OCCUPATIONAL MEDICINE (OUTPATIENT)
Dept: URGENT CARE | Facility: CLINIC | Age: 52
End: 2022-02-11
Payer: COMMERCIAL

## 2022-02-11 VITALS
SYSTOLIC BLOOD PRESSURE: 106 MMHG | WEIGHT: 208 LBS | OXYGEN SATURATION: 98 % | HEART RATE: 74 BPM | RESPIRATION RATE: 18 BRPM | BODY MASS INDEX: 32.65 KG/M2 | DIASTOLIC BLOOD PRESSURE: 80 MMHG | TEMPERATURE: 99 F | HEIGHT: 67 IN

## 2022-02-11 DIAGNOSIS — M25.511 ACUTE PAIN OF RIGHT SHOULDER: ICD-10-CM

## 2022-02-11 DIAGNOSIS — Z02.83 EMPLOYMENT-RELATED DRUG TESTING, ENCOUNTER FOR: ICD-10-CM

## 2022-02-11 DIAGNOSIS — M62.838 TRAPEZIUS MUSCLE SPASM: ICD-10-CM

## 2022-02-11 LAB
BREATH ALCOHOL COMMENT: NORMAL
POC BREATHALIZER: 0 PERCENT (ref 0–0.01)

## 2022-02-11 PROCEDURE — 99213 OFFICE O/P EST LOW 20 MIN: CPT | Performed by: FAMILY MEDICINE

## 2022-02-11 PROCEDURE — 82075 ASSAY OF BREATH ETHANOL: CPT | Performed by: FAMILY MEDICINE

## 2022-02-11 PROCEDURE — 80305 DRUG TEST PRSMV DIR OPT OBS: CPT | Performed by: FAMILY MEDICINE

## 2022-02-11 RX ORDER — METHYLPREDNISOLONE 4 MG/1
TABLET ORAL
COMMUNITY
Start: 2021-12-30 | End: 2022-02-17

## 2022-02-11 RX ORDER — BLOOD SUGAR DIAGNOSTIC
STRIP MISCELLANEOUS
COMMUNITY
Start: 2021-11-30

## 2022-02-11 RX ORDER — BLOOD-GLUCOSE METER
EACH MISCELLANEOUS
COMMUNITY
Start: 2021-11-30

## 2022-02-11 RX ORDER — HYDROCORTISONE 25 MG/G
CREAM TOPICAL
COMMUNITY
Start: 2021-11-30

## 2022-02-11 RX ORDER — LANCETS 33 GAUGE
EACH MISCELLANEOUS
COMMUNITY
Start: 2021-11-30

## 2022-02-11 RX ORDER — MELOXICAM 15 MG/1
15 TABLET ORAL DAILY
COMMUNITY
Start: 2021-11-30 | End: 2022-10-22

## 2022-02-11 RX ORDER — ATORVASTATIN CALCIUM 20 MG/1
20 TABLET, FILM COATED ORAL DAILY
COMMUNITY
Start: 2021-11-30

## 2022-02-11 NOTE — LETTER
"   [facility] [address] - [place] [address]  Phone:  [phone] - Fax:  [phone]   [facility] Progress Report and Disability Certification  Date of Service: 2/11/2022   No Show:  No  Date / Time of Next Visit: 2/15/2022   Claim Information   Patient Name: Yamil Kidd  Claim Number:     Employer: [employer]  Date of Injury: 1/19/2022     Insurer / TPA: [employer] ID / SSN:     Occupation:   Diagnosis: Diagnoses of Trapezius muscle spasm and Acute pain of right shoulder were pertinent to this visit.    Medical Information   Related to Industrial Injury? Yes    Subjective Complaints:  DOI: 1/19/2022  RIGO: He was lifting something heavy at work when he suddenly developed right shoulder pain. The pain is located in his lateral shoulder, it is constant but worse with certain movements, it is described as \"pulling\", currently rated 8/10. He has been using massage, Tylenol, and Ibuprofen, without any significant relief. No associated weakness to right upper extremity. He is right hand dominant. No prior right shoulder injury. He denies secondary employment.      Objective Findings: No discolorations or deformities noted to inspection of neck or right shoulder.  Active ROM to right shoulder. Empty can sign negative bilaterally.  Equal strength and sensation to upper extremities bilaterally.  No step-offs or areas of tenderness noted to palpation of spine.  He is tender to palpation of the trapezius region on the right, not the left.   Pre-Existing Condition(s):     Assessment:   Initial Visit    Status: Additional Care Required  Permanent Disability:No    Plan:      Diagnostics:      Comments:  -Please do the provided exercises  -Tylenol, Ibuprofen, and heat as needed for symptomatic relief  -Work restrictions include 0 hours of reaching above or below the shoulder on the right side, maximum lifting of 10 pounds    Disability Information "   Status: Released to Restricted Duty    From:  2/11/2022  Through: 2/15/2022 Restrictions are: Temporary   Physical Restrictions   Sitting:    Standing:    Stooping:    Bending:      Squatting:    Walking:    Climbing:    Pushing:      Pulling:    Other:    Reaching Above Shoulder (L):   Reaching Above Shoulder (R): 0 hrs/day     Reaching Below Shoulder (L):    Reaching Below Shoulder (R):  0 hrs/day   Not to exceed Weight Limits   Carrying(hrs):   Weight Limit(lb): < or = to 10 pounds Lifting(hrs):   Weight  Limit(lb): < or = to 10 pounds   Comments:      Repetitive Actions   Hands: i.e. Fine Manipulations from Grasping:     Feet: i.e. Operating Foot Controls:     Driving / Operate Machinery:     Health Care Provider’s Original or Electronic Signature  Raquel Lester M.D. Health Care Provider’s Original or Electronic Signature    Meek Morris MD         Clinic Name / Location: [address]  [place] [address] Clinic Phone Number: Dept: [phone]   Appointment Time: 1:45 Pm Visit Start Time: 3:16 PM   Check-In Time:  2:46 Pm Visit Discharge Time:    Original-Treating Physician or Chiropractor    Page 2-Insurer/TPA    Page 3-Employer    Page 4-Employee

## 2022-02-11 NOTE — PATIENT INSTRUCTIONS
Rehabilitación de distensión y esguince cervical  Cervical Strain and Sprain Rehab  Pregunte al médico qué ejercicios son seguros para usted. Alesha los ejercicios exactamente merna se lo haya indicado el médico y gradúelos merna se lo hayan indicado. Es normal sentir un estiramiento leve, tironeo, opresión o malestar al hacer estos ejercicios. Deténgase de inmediato si siente un dolor repentino o el dolor empeora. No comience a hacer estos ejercicios hasta que se lo indique el médico.  Ejercicios de elongación y amplitud de movimiento  Inclinación cervical hacia un lado    1. Siéntese en sander silla estable o póngase de pie y mantenga sander buena postura.  2. Sin  los hombros, incline lentamente el lado jennifer/derecho de la miriam y lleve la oreja hacia el hombro hasta sentir un estiramiento en los músculos del yunior en el lado opuesto. Mantenga la mirada al frente.  3. Mantenga esta posición eddi __________ segundos.  4. Repita el ejercicio con el otro lado del yunior.  Repita __________ veces. Realice conrado ejercicio __________ veces al día.  Rotación cervical    1. Siéntese en sander silla estable o póngase de pie y mantenga sander buena postura.  2. Lentamente, gire la miriam hacia un lado, merna si quisiera mirar por encima del hombro jennifer/derecho.  ? Mantenga la mirada paralela al suelo.  ? Deténgase cuando sienta un estiramiento en el costado y en la parte de atrás del yunior.  3. Mantenga esta posición eddi __________ segundos.  4. Para repetir el ejercicio, gire hacia el otro lado.  Repita __________ veces. Realice conrado ejercicio __________ veces al día.  Extensión torácica y estiramiento pectoral  1. Use sander toalla o sander manta pequeña para armar un rollo de 4 pulgadas (10 cm) de diámetro.  2. Acuéstese boca arriba sobre sander superficie firme.  3. Coloque la toalla a lo yas, debajo de la columna en la parte media de la espalda. No debe estar debajo de los omóplatos. La toalla debe estar alineada con  la columna, desde la parte media hasta la parte baja de la espalda.  4. Coloque las justin detrás de la miriam y deje caer los codos hacia los lados.  5. Mantenga esta posición eddi __________ segundos.  Repita __________ veces. Realice conrado ejercicio __________ veces al día.  Ejercicios de fortalecimiento  Flexión cervical superior, isométrica  1. Acuéstese boca arriba con sander almohada cb detrás de la miriam y sander toalla pequeña enrollada debajo del yunior.  2. Suavemente, hunda el mentón en el pecho y lleve la miriam hacia abajo para mirar en dirección a los pies. No levante la mriiam de la almohada.  3. Mantenga esta posición eddi __________ segundos.  4. Afloje lentamente la tensión. Relaje los músculos por completo antes de repetir el ejercicio.  Repita __________ veces. Realice conrado ejercicio __________ veces al día.  Extensión cervical, isométrica    1. Párese frente a sander pared, a sander distancia aproximada de 6 pulgadas (15 cm), con la espalda contra la pared.  2. Coloque un objeto blando, de unas 6 a 8 pulgadas (15 a 20 cm) de diámetro, entre la nuca y la pared. Puede ser sander almohada pequeña, sander pelota o sander toalla doblada.  3. Incline suavemente la miriam hacia atrás y ejerza presión sobre el objeto blando. Mantenga relajadas la mandíbula y la frente.  4. Mantenga esta posición eddi __________ segundos.  5. Afloje lentamente la tensión. Relaje los músculos por completo antes de repetir el ejercicio.  Repita __________ veces. Realice conrado ejercicio __________ veces al día.  Postura y mecánica corporal  La mecánica corporal se refiere a los movimientos y a las posiciones del cuerpo mientras realiza las actividades diarias. La postura es sander parte de la mecánica corporal. La buena postura y la mecánica corporal saludable pueden ayudar a aliviar el estrés en las articulaciones y los tejidos del cuerpo. La buena postura significa que la columna mantiene gomez posición natural de curvatura en forma de S  (la columna está en sander posición neutral), los hombros van un poco hacia atrás y la miriam no se inclina hacia adelante. A continuación, se incluyen pautas generales para mejorar la postura y la mecánica corporal en las actividades diarias.  Sentado    1. Cuando esté sentado, mantenga la columna en posición neutral y deje los pies apoyados en el suelo. Use un apoyapiés, si es necesario, y mantenga los muslos paralelos al suelo. Evite redondear los hombros e inclinar la miriam hacia adelante.  2. Cuando trabaje en un escritorio o con sander computadora, el escritorio debe estar a sander altura en la que las justin estén un poco más abajo que los codos. Deslice la silla debajo del escritorio, de modo de estar lo suficientemente cerca merna para mantener sander buena postura.  3. Cuando trabaje con sander computadora, coloque el monitor a sander altura que le permita mirar derecho hacia adelante, sin tener que inclinar la miriam hacia adelante o hacia atrás.  De pie    · Cuando esté de pie, mantenga la columna en posición neutral y los pies separados aproximadamente al ancho de caderas. Mantenga las rodillas ligeramente flexionadas. Las orejas, los hombros y las caderas deben estar alineados.  · Cuando realice sander tarea en la que deba estar de pie en el mismo sitio eddi mucho tiempo, coloque un pie en un objeto estable de 2 a 4 pulgadas (5 a 10 cm) de alto, merna un taburete. [place] ayuda a que la columna mantenga sander posición neutral.  Reposo  Al descansar o estar acostado, evite las posiciones que le causen más dolor. Intente sostener el yunior en sander posición neutral. Puede usar sander almohada anatómica o sander toalla pequeña enrollada. La almohada debe sostenerle el yunior, maikol no empujarlo.  Esta información no tiene merna fin reemplazar el consejo del médico. Asegúrese de hacerle al médico cualquier pregunta que tenga.  Document Released: 10/04/2007 Document Revised: 11/04/2019 Document Reviewed: 11/04/2019  Elsevier Patient  Education © 2020 Elsevier Inc.

## 2022-02-11 NOTE — LETTER
"EMPLOYEE’S CLAIM FOR COMPENSATION/ REPORT OF INITIAL TREATMENT  FORM C-4    EMPLOYEE’S CLAIM - PROVIDE ALL INFORMATION REQUESTED   First Name  Yamil Last Name  Chuy Kidd Birthdate                    1970                Sex  male Claim Number (Insurer’s Use Only)    Home Address  [address] Age  51 y.o. Height  1.702 m (5' 7\") Weight  94.3 kg (208 lb) [facility]     [facility] Zip  [address] Telephone  [phone] (home)    [address] [facility] [place]  [address] Primary Language Spoken  [place] Zeal[place]    Insurer   Third-Party    [employer]   Employee's Occupation (Job Title) When Injury or Occupational Disease Occurred      Employer's Name/Company Name  [employer]  Telephone  [phone]    Office Mail Address (Number and Street)   [address]  Zip  [address]    Date of Injury  1/19/2022               Hours Injury   Date Employer Notified  1/19/2022 Last Day of Work after Injury     or Occupational Disease   Supervisor to Whom Injury     Reported  Adam   Address or Location of Accident (if applicable)  Work [1]   What were you doing at the time of accident? (if applicable)  mubiendo sander cata / moving a ladder    How did this injury or occupational disease occur? (Be specific an answer in detail. Use additional sheet if necessary)  trabajando / working   If you believe that you have an occupational disease, when did you first have knowledge of the disability and it relationship to your employment?  n/a Witnesses to the Accident  solo al lid y al reporte al superbisor      Nature of Injury or Occupational Disease  Strain  Part(s) of Body Injured or Affected  Lower Back Area (Lumbar Area & Lumbo-Sacral), N/A, N/A    I certify that the above is true and correct to the best of my knowledge and that I have provided this information in order to obtain the benefits " of [place]  I hereby authorize any physician, chiropractor, surgeon, practitioner, or other person, any hospital, including [facility] or [facility], any medical service organization, any insurance company, or other institution or organization to release to each other, any medical or other information, including benefits paid or payable, pertinent to this injury or disease, except information relative to diagnosis, treatment and/or counseling for AIDS, psychological conditions, alcohol or controlled substances, for which I must give specific authorization.  A Photostat of this authorization shall be as valid as the original.     Date 02/11/2022   [facility]  Employee’s Original or  *Electronic Signature   THIS REPORT MUST BE COMPLETED AND MAILED WITHIN 3 WORKING DAYS OF TREATMENT   Place  [facility]  Name of Facility  [facility]   Date  2/11/2022 Diagnosis and Description of Injury or Occupational Disease  (M62.838) Trapezius muscle spasm  (M25.511) Acute pain of right shoulder Is there evidence the injured employee was under the influence of alcohol and/or another controlled substance at the time of accident?  ? No ? Yes (if yes, please explain)    Hour  3:16 PM   Diagnoses of Trapezius muscle spasm and Acute pain of right shoulder were pertinent to this visit. No   Treatment  -Please do the provided exercises  -Tylenol, Ibuprofen, and heat as needed for symptomatic relief  Have you advised the patient to remain off work five days or     more?    X-Ray Findings      ? Yes Indicate dates:   From   To      From information given by the employee, together with medical evidence, can        you directly connect this injury or occupational disease as job incurred?  Yes ? No If no, is the injured employee capable of:  ? full duty  No ? modified duty  Yes   Is additional  "medical care by a physician indicated?  Yes If Modified Duty, Specify any Limitations / Restrictions  -Work restrictions include 0 hours of reaching above or below the shoulder on the right side, maximum lifting of 10 pounds   Do you know of any previous injury or disease contributing to this condition or occupational disease?  ? Yes ? No (Explain if yes)                          No   Date  2/11/2022 Print Health Care Provider's   Ely Lester M.D. I certify the employer’s copy of  this form was mailed on:   Address  [address] Insurer’s Use Only     [facility] Zip  [address]    Provider’s Tax ID Number  [id number] Telephone  Dept: [phone]             Health Care Provider’s Original or Electronic Signature  e-ELY Ferreira M.D. Degree (MD,DO, DC,PAAlejandroC,APRN)   M.D.      * Complete and attach Release of Information (Form C-4A) when injured employee signs C-4 Form electronically  ORIGINAL - TREATING HEALTHCARE PROVIDER PAGE 2 - INSURER/TPA PAGE 3 - EMPLOYER PAGE 4 - EMPLOYEE             Form C-4 (rev.08/21)           BRIEF DESCRIPTION OF RIGHTS AND BENEFITS  (Pursuant to [place] 616[place])    Notice of Injury or Occupational Disease (Incident Report Form C-1): If an injury or occupational disease (OD) arises out of and in the course of employment, you must provide written notice to your employer as soon as practicable, but no later than 7 days after the accident or OD. Your employer shall maintain a sufficient supply of the required forms.    Claim for Compensation (Form C-4): If medical treatment is sought, the form C-4 is available at the place of initial treatment. A completed \"Claim for Compensation\" (Form C-4) must be filed within 90 days after an accident or OD. The treating physician or chiropractor must, within 3 working days after treatment, complete and mail to the employer, the employer's insurer and third-party , the Claim for " Compensation.    Medical Treatment: If you require medical treatment for your on-the-job injury or OD, you may be required to select a physician or chiropractor from a list provided by your workers’ compensation insurer, if it has contracted with an Organization for Managed Care (MCO) or Preferred Provider Organization (PPO) or providers of health care. If your employer has not entered into a contract with an MCO or PPO, you may select a physician or chiropractor from the Panel of Physicians and Chiropractors. Any medical costs related to your industrial injury or OD will be paid by your insurer.    Temporary Total Disability (TTD): If your doctor has certified that you are unable to work for a period of at least 5 consecutive days, or 5 cumulative days in a 20-day period, or places restrictions on you that your employer does not accommodate, you may be entitled to TTD compensation.    Temporary Partial Disability (TPD): If the wage you receive upon reemployment is less than the compensation for TTD to which you are entitled, the insurer may be required to pay you TPD compensation to make up the difference. TPD can only be paid for a maximum of 24 months.    Permanent Partial Disability (PPD): When your medical condition is stable and there is an indication of a PPD as a result of your injury or OD, within 30 days, your insurer must arrange for an evaluation by a rating physician or chiropractor to determine the degree of your PPD. The amount of your PPD award depends on the date of injury, the results of the PPD evaluation, your age and wage.    Permanent Total Disability (PTD): If you are medically certified by a treating physician or chiropractor as permanently and totally disabled and have been granted a PTD status by your insurer, you are entitled to receive monthly benefits not to exceed 66 2/3% of your average monthly wage. The amount of your PTD payments is subject to reduction if you previously received a  [facility]-sum PPD award.    Vocational Rehabilitation Services: You may be eligible for vocational rehabilitation services if you are unable to return to the job due to a permanent physical impairment or permanent restrictions as a result of your injury or occupational disease.    Transportation and Per Jose M Reimbursement: You may be eligible for travel expenses and per jose m associated with medical treatment.    Reopening: You may be able to reopen your claim if your condition worsens after claim closure.     Appeal Process: If you disagree with a written determination issued by the insurer or the insurer does not respond to your request, you may appeal to the Department of Administration, , by following the instructions contained in your determination letter. You must appeal the determination within 70 days from the date of the determination letter at [address], [address], [place], [place] [address], or [address], [place], [place] [address]. If you disagree with the  decision, you may appeal to the Department of Administration, . You must file your appeal within 30 days from the date of the  decision letter at [address], [address], [place], [place] [address], or [address], [address], [place], [place] [address]. If you disagree with a decision of an , you may file a petition for judicial review with the District Court. You must do so within 30 days of the Appeal Officer’s decision. You may be represented by an  at your own expense or you may contact the [facility] for possible representation.    [place]  for Injured Workers (NAIW): If you disagree with a  decision, you may request that NAIW represent you without charge at an  Hearing. For information regarding denial of benefits, you may contact the [facility] at: [address], [address], [place]  [address], [phone], or [address], [place] [address], [phone]    To File a Complaint with the Division: If you wish to file a complaint with the  of the Division of Industrial Relations (DIR),  please contact the Workers’ Compensation Section, [address], [address], [place], [place] [address], telephone [phone], or [address], [address], [place], [place] [address], telephone [phone].    For assistance with Workers’ Compensation Issues: You may contact the [facility] Office for Consumer Health Assistance, [address], [address], [place], [place] [address], Toll Free [phone], Web site: [email or web address] E-mail: [email or web address]              __________________________________________________________________                                    _________________            Employee Name / Signature                                                                                                                            Date                                                                                                                                                                                                                              D-2 (rev. 10/20)

## 2022-02-11 NOTE — PROGRESS NOTES
"  Subjective:     51 y.o. male presents for Shoulder Pain (RIGHT, injured while at work on 1/19/22. Pt states he was moving/pulling a ladder, felt a pain near bicep up into neck )      DOI: 1/19/2022  RIGO: He was lifting something heavy at work when he suddenly developed right shoulder pain. The pain is located in his lateral shoulder, it is constant but worse with certain movements, it is described as \"pulling\", currently rated 8/10. He has been using massage, Tylenol, and Ibuprofen, without any significant relief. No associated weakness to right upper extremity. He is right hand dominant. No prior right shoulder injury. He denies secondary employment.       PMH:   No pertinent past medical history to this problem  MEDS:  Medications were reviewed in EMR  ALLERGIES:  Allergies were reviewed in EMR  SOCHX:  Works as a   FH:   No pertinent family history to this problem     Objective:     /80   Pulse 74   Temp 37.2 °C (99 °F) (Temporal)   Resp 18   Ht 1.702 m (5' 7\")   Wt 94.3 kg (208 lb)   SpO2 98%   BMI 32.58 kg/m²     No discolorations or deformities noted to inspection of neck or right shoulder.  Active ROM to right shoulder. Empty can sign negative bilaterally.  Equal strength and sensation to upper extremities bilaterally.  No step-offs or areas of tenderness noted to palpation of spine.  He is tender to palpation of the trapezius region on the right, not the left.    Assessment/Plan:     1. Trapezius muscle spasm    2. Acute pain of right shoulder    Other orders  - atorvastatin (LIPITOR) 20 MG Tab; Take 20 mg by mouth every day.  - Blood Glucose Monitoring Suppl (ONE TOUCH ULTRA 2) w/Device Kit; USE TO CHECK GLUCOSE TWICE DAILY  - ONETOUCH ULTRA strip; USE 1 STRIP TO CHECK GLUCOSE TWICE DAILY  - PROCTO-MED HC 2.5 % Cream; APPLY CREAM TOPICALLY TO AFFECTED AREA TWICE DAILY  - Lancets (ONETOUCH DELICA PLUS [id number]) Misc; USE 1 LANCET TO CHECK GLUCOSE TWICE DAILY  - meloxicam (MOBIC) 15 MG " tablet; Take 15 mg by mouth every day.  - metformin (GLUCOPHAGE) 1000 MG tablet; Take 1,000 mg by mouth 2 times a day with meals.  - methylPREDNISolone (MEDROL DOSEPAK) 4 MG Tablet Therapy Pack; TAKE AS DIRECTED ON PACAKGE INSERT    • Released to Restricted Duty FROM 2/11/2022 TO 2/15/2022  •    • -Please do the provided exercises  • -Tylenol, Ibuprofen, and heat as needed for symptomatic relief  • -Work restrictions include 0 hours of reaching above or below the shoulder on the right side, maximum lifting of 10 pounds    Differential diagnosis, natural history, supportive care, and indications for immediate follow-up discussed.

## 2022-02-17 ENCOUNTER — OCCUPATIONAL MEDICINE (OUTPATIENT)
Dept: URGENT CARE | Facility: CLINIC | Age: 52
End: 2022-02-17
Payer: COMMERCIAL

## 2022-02-17 VITALS
SYSTOLIC BLOOD PRESSURE: 136 MMHG | HEART RATE: 74 BPM | TEMPERATURE: 98.6 F | DIASTOLIC BLOOD PRESSURE: 68 MMHG | WEIGHT: 207 LBS | OXYGEN SATURATION: 97 % | BODY MASS INDEX: 33.27 KG/M2 | HEIGHT: 66 IN | RESPIRATION RATE: 16 BRPM

## 2022-02-17 DIAGNOSIS — M62.838 TRAPEZIUS MUSCLE SPASM: ICD-10-CM

## 2022-02-17 DIAGNOSIS — M25.511 ACUTE PAIN OF RIGHT SHOULDER: ICD-10-CM

## 2022-02-17 PROCEDURE — 99213 OFFICE O/P EST LOW 20 MIN: CPT | Performed by: NURSE PRACTITIONER

## 2022-02-17 RX ORDER — POLYETHYLENE GLYCOL-3350 AND ELECTROLYTES 236; 6.74; 5.86; 2.97; 22.74 G/274.31G; G/274.31G; G/274.31G; G/274.31G; G/274.31G
POWDER, FOR SOLUTION ORAL
COMMUNITY
Start: 2022-02-11

## 2022-02-17 ASSESSMENT — ENCOUNTER SYMPTOMS
RESPIRATORY NEGATIVE: 1
GASTROINTESTINAL NEGATIVE: 1
MUSCULOSKELETAL NEGATIVE: 1
PSYCHIATRIC NEGATIVE: 1
CARDIOVASCULAR NEGATIVE: 1
NEUROLOGICAL NEGATIVE: 1
EYES NEGATIVE: 1
CONSTITUTIONAL NEGATIVE: 1

## 2022-02-17 NOTE — PROGRESS NOTES
"Subjective:   Yamil Kidd is a 51 y.o. male who presents for Follow-Up (01/19/22 DOI Right Shoulder WC)    HPI  DOI: 1/19/2022  RIGO: He was lifting something heavy at work when he suddenly developed right shoulder pain. The pain is located in his lateral shoulder, it is constant but worse with certain movements, it is described as \"pulling\", currently rated 8/10. He has been using massage, Tylenol, and Ibuprofen, without any significant relief. No associated weakness to right upper extremity. He is right hand dominant. No prior right shoulder injury. He denies secondary employment.      Visit #2-patient returns for follow-up office visit, states that he feels completely improved, does not have any pain with range of motion.  Take over-the-counter Tylenol and Advil as needed, which is not very often.  He would like to return to work full duty without restrictions.    PMH:               No pertinent past medical history to this problem  MEDS:             Medications were reviewed in EMR  ALLERGIES:   Allergies were reviewed in EMR  SOCHX:          Works as a   FH:                  No pertinent family history to this problem      Review of Systems   Constitutional: Negative.    HENT: Negative.    Eyes: Negative.    Respiratory: Negative.    Cardiovascular: Negative.    Gastrointestinal: Negative.    Genitourinary: Negative.    Musculoskeletal: Negative.    Skin: Negative.    Neurological: Negative.    Psychiatric/Behavioral: Negative.    All other systems reviewed and are negative.      MEDS:   Current Outpatient Medications:   •  atorvastatin (LIPITOR) 20 MG Tab, Take 20 mg by mouth every day., Disp: , Rfl:   •  Blood Glucose Monitoring Suppl (ONE TOUCH ULTRA 2) w/Device Kit, USE TO CHECK GLUCOSE TWICE DAILY, Disp: , Rfl:   •  ONETOUCH ULTRA strip, USE 1 STRIP TO CHECK GLUCOSE TWICE DAILY, Disp: , Rfl:   •  PROCTO-MED HC 2.5 % Cream, APPLY CREAM TOPICALLY TO AFFECTED AREA TWICE DAILY, Disp: , Rfl:   •  " "Lancets (ONETOUCH DELICA PLUS [id number]) Misc, USE 1 LANCET TO CHECK GLUCOSE TWICE DAILY, Disp: , Rfl:   •  meloxicam (MOBIC) 15 MG tablet, Take 15 mg by mouth every day., Disp: , Rfl:   •  metformin (GLUCOPHAGE) 1000 MG tablet, Take 1,000 mg by mouth 2 times a day with meals., Disp: , Rfl:   ALLERGIES: No Known Allergies    Patient's PMH, SocHx, SurgHx, FamHx, Drug allergies and medications were reviewed.     Objective:   /68 (BP Location: Left arm, Patient Position: Sitting, BP Cuff Size: Adult long)   Pulse 74   Temp 37 °C (98.6 °F) (Temporal)   Resp 16   Ht 1.676 m (5' 6\")   Wt 93.9 kg (207 lb)   SpO2 97%   BMI 33.41 kg/m²     Physical Exam  Vitals and nursing note reviewed.   Constitutional:       General: He is awake.      Appearance: Normal appearance. He is well-developed.   HENT:      Head: Normocephalic and atraumatic.      Right Ear: External ear normal.      Left Ear: External ear normal.      Nose: Nose normal.      Mouth/Throat:      Lips: Pink.      Mouth: Mucous membranes are moist.      Pharynx: Oropharynx is clear.   Eyes:      General: Lids are normal.      Extraocular Movements: Extraocular movements intact.      Conjunctiva/sclera: Conjunctivae normal.   Cardiovascular:      Rate and Rhythm: Normal rate and regular rhythm.   Pulmonary:      Effort: Pulmonary effort is normal.   Musculoskeletal:         General: Normal range of motion.      Cervical back: Normal range of motion.      Comments: MSK: Right upper shoulder he has full range of motion without difficulty or pain.    Skin:     General: Skin is warm and dry.   Neurological:      Mental Status: He is alert and oriented to person, place, and time.   Psychiatric:         Mood and Affect: Mood normal.         Behavior: Behavior normal. Behavior is cooperative.         Thought Content: Thought content normal.         Judgment: Judgment normal.         Assessment/Plan:   Assessment    1. Trapezius muscle spasm    2. Acute pain of " right shoulder    See D39.  Released to full duty without restrictions.  F/u PRN.

## 2022-02-17 NOTE — LETTER
"   [facility] [address] - [place] [address]  Phone:  [phone] - Fax:  [phone]   [facility] Progress Report and Disability Certification  Date of Service: 2/17/2022   No Show:  No  Date / Time of Next Visit:     Claim Information   Patient Name: Yamil Kidd  Claim Number:     Employer: [employer]  Date of Injury: 1/19/2022     Insurer / TPA: [employer]  ID / SSN:     Occupation:   Diagnosis: Diagnoses of Trapezius muscle spasm and Acute pain of right shoulder were pertinent to this visit.    Medical Information   Related to Industrial Injury? Yes    Subjective Complaints:  DOI: 1/19/2022  RIGO: He was lifting something heavy at work when he suddenly developed right shoulder pain. The pain is located in his lateral shoulder, it is constant but worse with certain movements, it is described as \"pulling\", currently rated 8/10. He has been using massage, Tylenol, and Ibuprofen, without any significant relief. No associated weakness to right upper extremity. He is right hand dominant. No prior right shoulder injury. He denies secondary employment.      Visit #2-patient returns for follow-up office visit, states that he feels completely improved, does not have any pain with range of motion.  Take over-the-counter Tylenol and Advil as needed, which is not very often.  He would like to return to work full duty without restrictions.    PMH:               No pertinent past medical history to this problem  MEDS:             Medications were reviewed in EMR  ALLERGIES:   Allergies were reviewed in EMR  SOCHX:          Works as a   FH:                  No pertinent family history to this problem   Objective Findings: MSK: Right upper shoulder he has full range of motion without difficulty or pain.    Pre-Existing Condition(s):     Assessment:   Condition Improved    Status: Discharged /  MMI  Permanent Disability:No    Plan: "      Diagnostics:      Comments:       Disability Information   Status: Released to Full Duty    From:     Through:   Restrictions are:     Physical Restrictions   Sitting:    Standing:    Stooping:    Bending:      Squatting:    Walking:    Climbing:    Pushing:      Pulling:    Other:    Reaching Above Shoulder (L):   Reaching Above Shoulder (R):       Reaching Below Shoulder (L):    Reaching Below Shoulder (R):      Not to exceed Weight Limits   Carrying(hrs):   Weight Limit(lb):   Lifting(hrs):   Weight  Limit(lb):     Comments:      Repetitive Actions   Hands: i.e. Fine Manipulations from Grasping:     Feet: i.e. Operating Foot Controls:     Driving / Operate Machinery:     Health Care Provider’s Original or Electronic Signature  RE HolguinRAnnaNAnna Health Care Provider’s Original or Electronic Signature    Meek Morris MD         Clinic Name / Location: [address] [place] [address] [place] [address] Clinic Phone Number: Dept: [phone]   Appointment Time: 9:00 Am Visit Start Time: 9:51 AM   Check-In Time:  8:40 Am Visit Discharge Time:     Original-Treating Physician or Chiropractor    Page 2-Insurer/TPA    Page 3-Employer    Page 4-Employee

## 2022-10-21 ENCOUNTER — APPOINTMENT (OUTPATIENT)
Dept: RADIOLOGY | Facility: MEDICAL CENTER | Age: 52
End: 2022-10-21
Attending: EMERGENCY MEDICINE
Payer: COMMERCIAL

## 2022-10-21 ENCOUNTER — HOSPITAL ENCOUNTER (EMERGENCY)
Facility: MEDICAL CENTER | Age: 52
End: 2022-10-22
Attending: EMERGENCY MEDICINE
Payer: COMMERCIAL

## 2022-10-21 DIAGNOSIS — S13.4XXA WHIPLASH INJURY TO NECK, INITIAL ENCOUNTER: ICD-10-CM

## 2022-10-21 DIAGNOSIS — S16.1XXA STRAIN OF NECK MUSCLE, INITIAL ENCOUNTER: ICD-10-CM
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
## 2022-10-21 PROCEDURE — 99284 EMERGENCY DEPT VISIT MOD MDM: CPT

## 2022-10-21 PROCEDURE — 71045 X-RAY EXAM CHEST 1 VIEW: CPT

## 2022-10-21 ASSESSMENT — PAIN DESCRIPTION - PAIN TYPE: TYPE: ACUTE PAIN

## 2022-10-22 VITALS
HEIGHT: 67 IN | BODY MASS INDEX: 32.96 KG/M2 | HEART RATE: 60 BPM | TEMPERATURE: 98.4 F | WEIGHT: 210 LBS | OXYGEN SATURATION: 95 % | RESPIRATION RATE: 16 BRPM | DIASTOLIC BLOOD PRESSURE: 76 MMHG | SYSTOLIC BLOOD PRESSURE: 110 MMHG

## 2022-10-22 PROCEDURE — 72125 CT NECK SPINE W/O DYE: CPT

## 2022-10-22 PROCEDURE — A9270 NON-COVERED ITEM OR SERVICE: HCPCS | Performed by: EMERGENCY MEDICINE

## 2022-10-22 PROCEDURE — 700102 HCHG RX REV CODE 250 W/ 637 OVERRIDE(OP): Performed by: EMERGENCY MEDICINE

## 2022-10-22 RX ORDER — CYCLOBENZAPRINE HCL 10 MG
10 TABLET ORAL ONCE
Status: COMPLETED | OUTPATIENT
Start: 2022-10-22 | End: 2022-10-22

## 2022-10-22 RX ORDER — CYCLOBENZAPRINE HCL 10 MG
10 TABLET ORAL 3 TIMES DAILY PRN
Qty: 12 TABLET | Refills: 0 | Status: SHIPPED | OUTPATIENT
Start: 2022-10-22 | End: 2024-02-29 | Stop reason: SDUPTHER

## 2022-10-22 RX ORDER — NAPROXEN 500 MG/1
500 TABLET ORAL ONCE
Status: COMPLETED | OUTPATIENT
Start: 2022-10-22 | End: 2022-10-22

## 2022-10-22 RX ORDER — NAPROXEN 500 MG/1
500 TABLET ORAL 2 TIMES DAILY WITH MEALS
Qty: 14 TABLET | Refills: 0 | Status: SHIPPED | OUTPATIENT
Start: 2022-10-22 | End: 2022-10-29

## 2022-10-22 RX ADMIN — CYCLOBENZAPRINE 10 MG: 10 TABLET, FILM COATED ORAL at 00:55

## 2022-10-22 RX ADMIN — NAPROXEN 500 MG: 500 TABLET ORAL at 00:49
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
## 2022-10-22 NOTE — ED NOTES
Patient from lobflorentin to [facility] ambulatory with steady gait accompanied by ED Tech and family. Chart up for ERP.

## 2023-08-02 ENCOUNTER — NON-PROVIDER VISIT (OUTPATIENT)
Dept: URGENT CARE | Facility: PHYSICIAN GROUP | Age: 53
End: 2023-08-02
Payer: COMMERCIAL

## 2023-08-02 DIAGNOSIS — Z02.1 PRE-EMPLOYMENT DRUG SCREENING: ICD-10-CM

## 2023-08-02 LAB
AMP AMPHETAMINE: NORMAL
BAR BARBITURATES: NORMAL
BZO BENZODIAZEPINES: NORMAL
COC COCAINE: NORMAL
INT CON NEG: NORMAL
INT CON POS: NORMAL
MDMA ECSTASY: NORMAL
MET METHAMPHETAMINES: NORMAL
MTD METHADONE: NORMAL
OPI OPIATES: NORMAL
OXY OXYCODONE: NORMAL
PCP PHENCYCLIDINE: NORMAL
POC URINE DRUG SCREEN OCDRS: NEGATIVE
THC: NORMAL

## 2023-08-02 PROCEDURE — 80305 DRUG TEST PRSMV DIR OPT OBS: CPT | Performed by: STUDENT IN AN ORGANIZED HEALTH CARE EDUCATION/TRAINING PROGRAM

## 2023-11-14 ENCOUNTER — OFFICE VISIT (OUTPATIENT)
Dept: URGENT CARE | Facility: CLINIC | Age: 53
End: 2023-11-14
Payer: COMMERCIAL

## 2023-12-18 ENCOUNTER — APPOINTMENT (OUTPATIENT)
Dept: RADIOLOGY | Facility: IMAGING CENTER | Age: 53
End: 2023-12-18
Attending: FAMILY MEDICINE
Payer: COMMERCIAL

## 2023-12-18 ENCOUNTER — OFFICE VISIT (OUTPATIENT)
Dept: SPORTS MEDICINE | Facility: CLINIC | Age: 53
End: 2023-12-18
Payer: COMMERCIAL

## 2023-12-18 VITALS
TEMPERATURE: 98.4 F | BODY MASS INDEX: 31.83 KG/M2 | WEIGHT: 210 LBS | HEIGHT: 68 IN | HEART RATE: 86 BPM | SYSTOLIC BLOOD PRESSURE: 128 MMHG | OXYGEN SATURATION: 97 % | RESPIRATION RATE: 16 BRPM | DIASTOLIC BLOOD PRESSURE: 86 MMHG

## 2023-12-18 DIAGNOSIS — M25.511 CHRONIC RIGHT SHOULDER PAIN: ICD-10-CM

## 2023-12-18 DIAGNOSIS — G89.29 CHRONIC RIGHT SHOULDER PAIN: ICD-10-CM

## 2023-12-18 PROCEDURE — 20610 DRAIN/INJ JOINT/BURSA W/O US: CPT | Mod: RT | Performed by: FAMILY MEDICINE

## 2023-12-18 PROCEDURE — 3079F DIAST BP 80-89 MM HG: CPT | Performed by: FAMILY MEDICINE

## 2023-12-18 PROCEDURE — 99213 OFFICE O/P EST LOW 20 MIN: CPT | Mod: 25 | Performed by: FAMILY MEDICINE

## 2023-12-18 PROCEDURE — 73030 X-RAY EXAM OF SHOULDER: CPT | Mod: TC,RT | Performed by: RADIOLOGY

## 2023-12-18 PROCEDURE — 3074F SYST BP LT 130 MM HG: CPT | Performed by: FAMILY MEDICINE

## 2023-12-18 RX ORDER — NAPROXEN 500 MG/1
500 TABLET ORAL 2 TIMES DAILY WITH MEALS
Qty: 40 TABLET | Refills: 0 | Status: SHIPPED | OUTPATIENT
Start: 2023-12-18

## 2023-12-18 RX ORDER — TRIAMCINOLONE ACETONIDE 40 MG/ML
40 INJECTION, SUSPENSION INTRA-ARTICULAR; INTRAMUSCULAR ONCE
Status: COMPLETED | OUTPATIENT
Start: 2023-12-18 | End: 2023-12-18

## 2023-12-18 RX ADMIN — TRIAMCINOLONE ACETONIDE 40 MG: 40 INJECTION, SUSPENSION INTRA-ARTICULAR; INTRAMUSCULAR at 14:07

## 2023-12-18 NOTE — PROCEDURES
PROCEDURE NOTE:  right Shoulder subacromial injection  Risks and benefits discussed  Informed consent obtained  Shoulder prepped in sterile fashion utilizing a posterior approach  40 mg of Kenalog and 5 cc of bupivacaine injected into the subacromial space  Vapocoolant spray was utilized  Patient tolerated the procedure well  Postprocedure care and red flags discussed
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
## 2023-12-18 NOTE — LETTER
December 18, 2023         Patient: Yamil Kidd   YOB: 1970   Date of Visit: 12/18/2023           To Whom it May Concern:    Yamil Kidd was seen in my clinic on 12/18/2023.  He should limit overhead activity and avoid lifting greater than 25 pounds until reevaluation in 1 month.    If you have any questions or concerns, please don't hesitate to call.        Sincerely,           Mukesh Valdez M.D.  Electronically Signed

## 2024-01-25 ENCOUNTER — OFFICE VISIT (OUTPATIENT)
Dept: SPORTS MEDICINE | Facility: CLINIC | Age: 54
End: 2024-01-25
Payer: COMMERCIAL

## 2024-01-25 VITALS
TEMPERATURE: 98.1 F | WEIGHT: 210 LBS | BODY MASS INDEX: 31.83 KG/M2 | OXYGEN SATURATION: 96 % | DIASTOLIC BLOOD PRESSURE: 78 MMHG | RESPIRATION RATE: 18 BRPM | HEART RATE: 80 BPM | HEIGHT: 68 IN | SYSTOLIC BLOOD PRESSURE: 122 MMHG

## 2024-01-25 DIAGNOSIS — M54.2 CERVICALGIA: ICD-10-CM

## 2024-01-25 DIAGNOSIS — M25.511 CHRONIC RIGHT SHOULDER PAIN: ICD-10-CM

## 2024-01-25 DIAGNOSIS — G89.29 CHRONIC RIGHT SHOULDER PAIN: ICD-10-CM

## 2024-01-25 PROCEDURE — 3078F DIAST BP <80 MM HG: CPT | Performed by: FAMILY MEDICINE

## 2024-01-25 PROCEDURE — 99213 OFFICE O/P EST LOW 20 MIN: CPT | Performed by: FAMILY MEDICINE

## 2024-01-25 PROCEDURE — 3074F SYST BP LT 130 MM HG: CPT | Performed by: FAMILY MEDICINE
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
## 2024-02-29 ENCOUNTER — OFFICE VISIT (OUTPATIENT)
Dept: SPORTS MEDICINE | Facility: OTHER | Age: 54
End: 2024-02-29
Payer: COMMERCIAL

## 2024-02-29 VITALS
DIASTOLIC BLOOD PRESSURE: 76 MMHG | OXYGEN SATURATION: 96 % | BODY MASS INDEX: 31.83 KG/M2 | SYSTOLIC BLOOD PRESSURE: 120 MMHG | TEMPERATURE: 98.5 F | WEIGHT: 210 LBS | RESPIRATION RATE: 18 BRPM | HEIGHT: 68 IN | HEART RATE: 76 BPM

## 2024-02-29 DIAGNOSIS — S13.4XXA WHIPLASH INJURY TO NECK, INITIAL ENCOUNTER: ICD-10-CM

## 2024-02-29 DIAGNOSIS — M25.511 CHRONIC RIGHT SHOULDER PAIN: ICD-10-CM

## 2024-02-29 DIAGNOSIS — G89.29 CHRONIC RIGHT SHOULDER PAIN: ICD-10-CM

## 2024-02-29 DIAGNOSIS — M54.2 CERVICALGIA: ICD-10-CM

## 2024-02-29 PROCEDURE — 3078F DIAST BP <80 MM HG: CPT | Performed by: FAMILY MEDICINE

## 2024-02-29 PROCEDURE — 3074F SYST BP LT 130 MM HG: CPT | Performed by: FAMILY MEDICINE

## 2024-02-29 PROCEDURE — 99214 OFFICE O/P EST MOD 30 MIN: CPT | Performed by: FAMILY MEDICINE

## 2024-02-29 RX ORDER — CYCLOBENZAPRINE HCL 10 MG
10 TABLET ORAL NIGHTLY PRN
Qty: 20 TABLET | Refills: 0 | Status: SHIPPED | OUTPATIENT
Start: 2024-02-29
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
## 2024-07-05 ENCOUNTER — OFFICE VISIT (OUTPATIENT)
Dept: URGENT CARE | Facility: PHYSICIAN GROUP | Age: 54
End: 2024-07-05
Payer: COMMERCIAL

## 2024-07-05 VITALS
RESPIRATION RATE: 16 BRPM | SYSTOLIC BLOOD PRESSURE: 118 MMHG | HEART RATE: 75 BPM | WEIGHT: 217.59 LBS | HEIGHT: 67 IN | DIASTOLIC BLOOD PRESSURE: 80 MMHG | BODY MASS INDEX: 34.15 KG/M2 | TEMPERATURE: 98 F | OXYGEN SATURATION: 97 %

## 2024-07-05 DIAGNOSIS — S05.01XA ABRASION OF RIGHT CORNEA, INITIAL ENCOUNTER: ICD-10-CM

## 2024-07-05 PROCEDURE — 99213 OFFICE O/P EST LOW 20 MIN: CPT | Performed by: FAMILY MEDICINE

## 2024-07-05 PROCEDURE — 3074F SYST BP LT 130 MM HG: CPT | Performed by: FAMILY MEDICINE

## 2024-07-05 PROCEDURE — 3079F DIAST BP 80-89 MM HG: CPT | Performed by: FAMILY MEDICINE

## 2024-07-05 RX ORDER — TOBRAMYCIN 3 MG/ML
1 SOLUTION/ DROPS OPHTHALMIC 4 TIMES DAILY
Qty: 2 ML | Refills: 0 | Status: SHIPPED | OUTPATIENT
Start: 2024-07-05 | End: 2024-07-12